# Patient Record
Sex: MALE | Race: WHITE | URBAN - METROPOLITAN AREA
[De-identification: names, ages, dates, MRNs, and addresses within clinical notes are randomized per-mention and may not be internally consistent; named-entity substitution may affect disease eponyms.]

---

## 2018-06-10 ENCOUNTER — NURSE TRIAGE (OUTPATIENT)
Dept: NURSING | Facility: CLINIC | Age: 53
End: 2018-06-10

## 2018-06-10 NOTE — TELEPHONE ENCOUNTER
"    Reason for Disposition    [1] Toe injury AND [2] bad limp or can't wear shoes/sandals    Additional Information    Negative: [1] Major bleeding (e.g., spurting blood) AND [2] can't be stopped    Negative: Foot or ankle injury    Negative: Looks infected    Negative: Amputated toe    Negative: Skin is split open or gaping  (or length > 1/2 inch or 12 mm)    Negative: [1] Bleeding AND [2] won't stop after 10 minutes of direct pressure (using correct technique)    Negative: [1] Dirt in the wound AND [2] not removed with 15 minutes of scrubbing    Negative: Sounds like a serious injury to the triager    Negative: [1] SEVERE pain AND [2] not improved 2 hours after pain medicine/ice packs    Negative: [1] MODERATE-SEVERE pain AND [2] blood present under the toenail    Negative: Looks like a broken bone (e.g., crooked or deformed)    Negative: Looks like a dislocated joint (e.g., crooked or deformed)    Negative: Toenail is completely torn off (toenail avulsion)    Negative: Base of toenail has popped out of the skin fold (nail base dislocation)    Answer Assessment - Initial Assessment Questions  1. MECHANISM: \"How did the injury happen?\"       Kicked dresser during a dream  2. ONSET: \"When did the injury happen?\" (Minutes or hours ago)       During the night  3. LOCATION: \"What part of the toe is injured?\" \"Is the nail damaged?\"       Left big toe is painful and swollen, bruised  4. APPEARANCE of TOE INJURY: \"What does the injury look like?\"       Swollen, bruised, no deformed  5. SEVERITY: \"Can you use the foot normally?\" \"Can you walk?\"       Painful, walking on heel  6. SIZE: For cuts, bruises, or swelling, ask: \"How large is it?\" (e.g., inches or centimeters;  entire toe)       no  7. PAIN: \"Is there pain?\" If so, ask: \"How bad is the pain?\"   (e.g., Scale 1-10; or mild, moderate, severe)      Yes, mild-moderate  8. TETANUS: For any breaks in the skin, ask: \"When was the last tetanus booster?\"      n/a  9. " "DIABETES: \"Do you have a history of diabetes or poor circulation in the feet?\"      no  10. OTHER SYMPTOMS: \"Do you have any other symptoms?\"         no  11. PREGNANCY: \"Is there any chance you are pregnant?\" \"When was your last menstrual period?\"        no    Protocols used: TOE INJURY-ADULT-AH      "